# Patient Record
Sex: FEMALE | Race: WHITE
[De-identification: names, ages, dates, MRNs, and addresses within clinical notes are randomized per-mention and may not be internally consistent; named-entity substitution may affect disease eponyms.]

---

## 2017-09-10 ENCOUNTER — HEALTH MAINTENANCE LETTER (OUTPATIENT)
Age: 37
End: 2017-09-10

## 2018-05-31 ENCOUNTER — PATIENT OUTREACH (OUTPATIENT)
Dept: CARE COORDINATION | Facility: CLINIC | Age: 38
End: 2018-05-31

## 2018-05-31 DIAGNOSIS — F41.9 ANXIETY: Primary | ICD-10-CM

## 2018-06-14 ENCOUNTER — PATIENT OUTREACH (OUTPATIENT)
Dept: CARE COORDINATION | Facility: CLINIC | Age: 38
End: 2018-06-14

## 2019-01-15 ENCOUNTER — TRANSFERRED RECORDS (OUTPATIENT)
Dept: HEALTH INFORMATION MANAGEMENT | Facility: CLINIC | Age: 39
End: 2019-01-15

## 2019-01-22 ENCOUNTER — OFFICE VISIT (OUTPATIENT)
Dept: OPHTHALMOLOGY | Facility: CLINIC | Age: 39
End: 2019-01-22
Payer: COMMERCIAL

## 2019-01-22 DIAGNOSIS — H16.223 KERATOCONJUNCTIVITIS SICCA OF BOTH EYES NOT SPECIFIED AS SJOGREN'S: Primary | ICD-10-CM

## 2019-01-22 DIAGNOSIS — H02.883 MEIBOMIAN GLAND DYSFUNCTION (MGD) OF BOTH EYES: ICD-10-CM

## 2019-01-22 DIAGNOSIS — H16.142: ICD-10-CM

## 2019-01-22 DIAGNOSIS — H02.886 MEIBOMIAN GLAND DYSFUNCTION (MGD) OF BOTH EYES: ICD-10-CM

## 2019-01-22 PROCEDURE — 92002 INTRM OPH EXAM NEW PATIENT: CPT | Performed by: OPHTHALMOLOGY

## 2019-01-22 RX ORDER — HYDROCHLOROTHIAZIDE 25 MG/1
25 TABLET ORAL DAILY
COMMUNITY

## 2019-01-22 RX ORDER — DOXYCYCLINE HYCLATE 50 MG/1
50 CAPSULE ORAL 2 TIMES DAILY
Qty: 60 CAPSULE | Refills: 0 | Status: SHIPPED | OUTPATIENT
Start: 2019-01-22 | End: 2019-03-23

## 2019-01-22 RX ORDER — CYCLOBENZAPRINE HCL 5 MG
TABLET ORAL
Refills: 3 | COMMUNITY
Start: 2018-12-30

## 2019-01-22 RX ORDER — BUSPIRONE HYDROCHLORIDE 7.5 MG/1
7.5 TABLET ORAL
COMMUNITY
Start: 2017-11-17

## 2019-01-22 RX ORDER — CYCLOSPORINE 0.5 MG/ML
1 EMULSION OPHTHALMIC EVERY 12 HOURS
Qty: 5.5 ML | Refills: 3 | Status: SHIPPED | OUTPATIENT
Start: 2019-01-22

## 2019-01-22 ASSESSMENT — VISUAL ACUITY
OS_PH_CC: 20/30
OS_CC+: -1
CORRECTION_TYPE: GLASSES
OD_CC: 20/20
OS_CC: 20/40
OS_PH_CC+: -1
METHOD: SNELLEN - LINEAR

## 2019-01-22 ASSESSMENT — TONOMETRY
IOP_METHOD: ICARE
OS_IOP_MMHG: 17
OD_IOP_MMHG: 17

## 2019-01-22 ASSESSMENT — EXTERNAL EXAM - RIGHT EYE: OD_EXAM: ROSACEA

## 2019-01-22 ASSESSMENT — REFRACTION_MANIFEST
OD_SPHERE: -1.75
OD_CYLINDER: +1.25
OS_CYLINDER: +0.75
OD_AXIS: 018
METHOD_AUTOREFRACTION: 1
OS_SPHERE: -1.50
OS_AXIS: 179

## 2019-01-22 ASSESSMENT — EXTERNAL EXAM - LEFT EYE: OS_EXAM: ROSACEA

## 2019-01-22 NOTE — PROGRESS NOTES
Assessment & Plan   Bhargavi Giang is a 38 year old female who presents with:   Review of systems for the eyes was negative other than the pertinent positives and negatives noted in the HPI.  History is obtained from the patient.    Chief Complaint   Patient presents with     Referral     Here at the request of Dr. Song for evaluation of left eye.  Redness for about 1 1/2 yrs.  Intermittent. Has been doing the last round of treatment for the last month.      Eye Problem Left Eye     Keratoconjunctivitis sicca of both eyes not specified as Sjogren's  - Start taking Omega 3 fatty acids, 2000 mg daily  - Finish clairdex wipes  - cycloSPORINE (RESTASIS) 0.05 % ophthalmic emulsion; Place 1 drop into both eyes every 12 hours  - doxycycline hyclate (VIBRAMYCIN) 50 MG capsule; Take 1 capsule (50 mg) by mouth 2 times daily    Meibomian gland dysfunction (MGD) of both eyes  - Recommend [facility] eval and treatment in [place] on Friday of this week.  - Continue good LH/WC,   - Start taking Omega 3 fatty acids, 2000 mg daily  - Finish clairdex wipes  - cycloSPORINE (RESTASIS) 0.05 % ophthalmic emulsion; Place 1 drop into both eyes every 12 hours  - doxycycline hyclate (VIBRAMYCIN) 50 MG capsule; Take 1 capsule (50 mg) by mouth 2 times daily    Thygeson's disease, left  - clinical history and exam are c/w this diagnosis  - recommend steroid taper over 3 weeks  - start cycloSPORINE (RESTASIS) 0.05 % ophthalmic emulsion; Place 1 drop into both eyes every 12 hours  - start doxycycline hyclate (VIBRAMYCIN) 50 MG capsule; Take 1 capsule (50 mg) by mouth 2 times daily    With Rosacea, MGD, and blepharitis I recommended an eyelid-surface disease evaluation in [place] with miebomian gland imaging and possible Lipiflow.    Return in about 3 days (around 1/25/2019) for in [place].    Documentation for today's encounter was performed by Linda Camejo COA. [employer]. Acting as a scribe in my presence. I have reviewed and verified that it is an  accurate recording of today's encounter.    Attending Physician Attestation:  Complete documentation of historical and exam elements from today's encounter can be found in the full encounter summary report (not reduplicated in this progress note).  I personally obtained the chief complaint(s) and history of present illness.  I confirmed and edited as necessary the review of systems, past medical/surgical history, family history, social history, and examination findings as documented by others; and I examined the patient myself.  I personally reviewed the relevant tests, images, and reports as documented above.  I formulated and edited as necessary the assessment and plan and discussed the findings and management plan with the patient and family. - Tyson Butler MD

## 2019-01-22 NOTE — NURSING NOTE
Patient presents with:  Referral: Here at the request of Dr. Song for evaluation of left eye.  Redness for about 1 1/2 yrs.  Intermittent. Has been doing the last round of treatment for the last month.   Eye Problem Left Eye      Referring Provider:  David Song, OD  EYE [place] [facility]  [address] [address]  [place] [address]        Katheryn Kaminski, COT

## 2019-01-22 NOTE — PATIENT INSTRUCTIONS
[facility]  [place] Office  [address]  [place] [address]  [phone]    [email or web address]    Monday 8:00am- 5:00pm  Tuesday 8:00am- 5:00pm  Wednesday 8:00am- 5:00pm  Thursday 8:00am- 5:00pm  Friday 8:00am- 4:00pm    You will be called for an appt for an evaluation of the eyelids in [place] on Friday 1/25/19    Start taking Omega 3 fatty acids 2,000mg daily  Take Doxycycline 50 mg twice daily for 60 days, this was sent to your pharmacy.  Start Restasis 1 drop in both eyes twice daily. This was also sent to your pharmacy  Finish the claridex wipes    Continue prednisolone on the following schedule:    3 times per day for 7 day(s) then  2 times per day for 7 day(s) then  Once daily for 7 day(s) then    Stop        Good Lid Hygeine  To treat the problem, you need to keep your eyelids clean. Warm compresses help reduce redness and swelling and keep the eyelids clean. You may also need to clean your eyelids when you wake up.    To apply a warm compress:  1. Wash your hands with soap and warm water.  2. Wet a clean washcloth with warm water. Then wring it out.  3. Close your eyes and place the washcloth over your eyelids for 3-5 minutes. This helps loosen scales or crusts.  4. Wet the washcloth again as often as needed to keep it warm.  Repeat 2 or more times a day. Use a clean washcloth each time.    To use an eyelid scrub:  1. Wash your hands with soap and warm water.  2. Use a ready-made eyelid scrub. Or, mix 3 drops of baby shampoo in 1/4 cup of warm water.  3. Dip a lint-free pad, cotton swab, or clean washcloth in the scrub.  4. Close one eye and gently scrub the base of the eyelid.  5. Rinse the lid in cool water and dry with a clean towel.  6. Repeat on the other eye.

## 2019-01-22 NOTE — LETTER
1/22/2019       RE: Bhargavi Giang  [address] [facility] [address]     Dear Colleague,    Thank you for referring your patient, Bhargavi Giang, to the [facility]. Please see a copy of my visit note below.    Assessment & Plan   Bhargavi Giang is a 38 year old female who presents with:   Review of systems for the eyes was negative other than the pertinent positives and negatives noted in the HPI.  History is obtained from the patient.    Chief Complaint   Patient presents with     Referral     Here at the request of Dr. Song for evaluation of left eye.  Redness for about 1 1/2 yrs.  Intermittent. Has been doing the last round of treatment for the last month.      Eye Problem Left Eye     Keratoconjunctivitis sicca of both eyes not specified as Sjogren's  - Start taking Omega 3 fatty acids, 2000 mg daily  - Finish clairdex wipes  - cycloSPORINE (RESTASIS) 0.05 % ophthalmic emulsion; Place 1 drop into both eyes every 12 hours  - doxycycline hyclate (VIBRAMYCIN) 50 MG capsule; Take 1 capsule (50 mg) by mouth 2 times daily    Meibomian gland dysfunction (MGD) of both eyes  - Recommend [facility] eval and treatment in [place] on Friday of this week.  - Continue good LH/WC,   - Start taking Omega 3 fatty acids, 2000 mg daily  - Finish clairdex wipes  - cycloSPORINE (RESTASIS) 0.05 % ophthalmic emulsion; Place 1 drop into both eyes every 12 hours  - doxycycline hyclate (VIBRAMYCIN) 50 MG capsule; Take 1 capsule (50 mg) by mouth 2 times daily    Thygeson's disease, left  - clinical history and exam are c/w this diagnosis  - recommend steroid taper over 3 weeks  - start cycloSPORINE (RESTASIS) 0.05 % ophthalmic emulsion; Place 1 drop into both eyes every 12 hours  - start doxycycline hyclate (VIBRAMYCIN) 50 MG capsule; Take 1 capsule (50 mg) by mouth 2 times daily    With Rosacea, MGD, and blepharitis I recommended an eyelid-surface disease  evaluation in [place] with miebomian gland imaging and possible Lipiflow.    Return in about 3 days (around 1/25/2019) for in [place].    Documentation for today's encounter was performed by Linda REYNAGA. [employer]. Acting as a scribe in my presence. I have reviewed and verified that it is an accurate recording of today's encounter.    Attending Physician Attestation:  Complete documentation of historical and exam elements from today's encounter can be found in the full encounter summary report (not reduplicated in this progress note).  I personally obtained the chief complaint(s) and history of present illness.  I confirmed and edited as necessary the review of systems, past medical/surgical history, family history, social history, and examination findings as documented by others; and I examined the patient myself.  I personally reviewed the relevant tests, images, and reports as documented above.  I formulated and edited as necessary the assessment and plan and discussed the findings and management plan with the patient and family. - Tyson Butler MD      Again, thank you for allowing me to participate in the care of your patient.      Sincerely,    Tyson Butler MD